# Patient Record
Sex: MALE | Race: WHITE
[De-identification: names, ages, dates, MRNs, and addresses within clinical notes are randomized per-mention and may not be internally consistent; named-entity substitution may affect disease eponyms.]

---

## 2021-05-27 NOTE — XMS
PreManage Notification: ELDA VARGAS MRN:[id number]
 
Security Information
 
Security Events
No recent Security Events currently on file
 
 
 
CRITERIA MET
------------
- [facility]
- [facility] - 2 Visits in 30 Days
 
 
CARE PROVIDERS
There are no care providers on record at this time.
 
Yan has no Care Guidelines for this patient.
 
JAKE VISIT COUNT (12 MO.)
-------------------------------------------------------------------------------------
[facility]
-------------------------------------------------------------------------------------
TOTAL 2
-------------------------------------------------------------------------------------
NOTE: Visits indicate total known visits.
 
ED/C VISIT TRACKING (12 MO.)
-------------------------------------------------------------------------------------
[facility]2021 [facility]
 
[facility]
 
COMPLAINT:
- L KNEE WOUND CARE
-------------------------------------------------------------------------------------
05/25/2021 15:03
[facility] [place]
 
TYPE: Emergency
 
COMPLAINT:
- L KNEE PAIN
-------------------------------------------------------------------------------------
 
 
INPATIENT VISIT TRACKING (12 MO.)
No inpatient visits to display in this time frame
 
[email or web address]

## 2021-07-17 NOTE — XMS
PreManage Notification: ELDA VARGAS MRN:[id number]
 
Security Information
 
Security Events
No recent Security Events currently on file
 
 
 
CRITERIA MET
------------
- [facility] - 2 [facility]
 
 
CARE PROVIDERS
-------------------------------------------------------------------------------------
RENETTA CARR      [facility]     06/02/2021-Current
 
PHONE: [phone]
-------------------------------------------------------------------------------------
 
Yan has no Care Guidelines for this patient.
 
JAKE VISIT COUNT (12 MO.)
-------------------------------------------------------------------------------------
4 [facility]
-------------------------------------------------------------------------------------
TOTAL 4
-------------------------------------------------------------------------------------
NOTE: Visits indicate total known visits.
 
ED/UCC VISIT TRACKING (12 MO.)
-------------------------------------------------------------------------------------
07/17/2021 14:07
[facility] [place]
 
TYPE: Emergency
 
COMPLAINT:
- BEE STING
-------------------------------------------------------------------------------------
06/28/2021 06:38
[facility] [place]
 
TYPE: Emergency
 
COMPLAINT:
- TONGUE SWELLING
 
DIAGNOSES:
- Essential (primary) hypertension
- Angioneurotic edema, initial encounter
- Allergy status to sulfonamides
- Gastro-esophageal reflux disease without esophagitis
- Other long term (current) drug therapy
-------------------------------------------------------------------------------------
05/27/2021 07:01
[facility] [place]
 
 
TYPE: Emergency
 
COMPLAINT:
- L KNEE INFECTION
 
DIAGNOSES:
- Long term (current) use of opiate analgesic
- Personal history of nicotine dependence
- Essential (primary) hypertension
- Allergy status to sulfonamides
- Gastro-esophageal reflux disease without esophagitis
- Other long term (current) drug therapy
- Cellulitis of left lower limb
-------------------------------------------------------------------------------------
05/25/2021 15:03
[facility] [place]
 
TYPE: Emergency
 
COMPLAINT:
- L KNEE PAIN
 
DIAGNOSES:
- Allergy status to sulfonamides
- Gastro-esophageal reflux disease without esophagitis
- Prepatellar bursitis, left knee
- Essential (primary) hypertension
- Long term (current) use of opiate analgesic
- Other long term (current) drug therapy
-------------------------------------------------------------------------------------
 
 
INPATIENT VISIT TRACKING (12 MO.)
No inpatient visits to display in this time frame
 
[email or web address]

## 2023-03-20 ENCOUNTER — HOSPITAL ENCOUNTER (OUTPATIENT)
Dept: HOSPITAL 46 - DS | Age: 53
Discharge: HOME | End: 2023-03-20
Attending: SPECIALIST
Payer: COMMERCIAL

## 2023-03-20 VITALS — HEIGHT: 69 IN | WEIGHT: 244.49 LBS | BODY MASS INDEX: 36.21 KG/M2

## 2023-03-20 DIAGNOSIS — Z88.8: ICD-10-CM

## 2023-03-20 DIAGNOSIS — Z79.899: ICD-10-CM

## 2023-03-20 DIAGNOSIS — G89.18: ICD-10-CM

## 2023-03-20 DIAGNOSIS — M17.11: Primary | ICD-10-CM

## 2023-03-20 DIAGNOSIS — Z88.2: ICD-10-CM

## 2023-03-20 PROCEDURE — 0SRC0JA REPLACEMENT OF RIGHT KNEE JOINT WITH SYNTHETIC SUBSTITUTE, UNCEMENTED, OPEN APPROACH: ICD-10-PCS | Performed by: SPECIALIST

## 2023-03-20 PROCEDURE — C1713 ANCHOR/SCREW BN/BN,TIS/BN: HCPCS

## 2023-03-20 PROCEDURE — C1776 JOINT DEVICE (IMPLANTABLE): HCPCS

## 2023-03-20 NOTE — NUR
1447-PATIENT RATES PAIN 5/10. STATES HE CAN NOT FEEL THE COLD FROM THE ICE
PACK. PAIN MEDICATION GIVEN PER EMAR.
 
1449-PATIENT LAYING IN BED. RESP EVEN AND UNLABORED. RATES PAIN 5/10. DENIES
NAUSEA. READJUSTED CRYO CUFF AND THIS SEEMED TO HELP WITH PAIN. DRESSING IS
CLEAN, DRY, AND INTACT. ON-Q PUMP SET AT 4. WIFE IN ROOM. CALL LIGHT WITHIN
ROOM.
 
1455-PT IN PATIENTS ROOM.

## 2023-03-20 NOTE — NUR
1237-PATIENT STATES PAIN IS A 5/10. STATES THE PAIN IS IN THE BACK OF THE
KNEE. DESCRIBES IT AS THROBBING PAIN. MOVED ICE PACK TO BACK OF KNEE AND PAIN
MEDICATION GIVEN PER EMAR.

## 2023-03-20 NOTE — NUR
1550-PROVIDED DISCHARGE INSTRUCTIONS TO PATIENT AND HIS WIFE. ALL QUESTIONS
ANSWERD. RATES PAIN 2/10. PATIENT AMBULATES WITH WALKER TO WHEELCHAIR GAIT
STEADY AND TOLERATED. RIDE PROVIEDED TO FRONT OF HOSPITAL WHERE WIFE WAS
WAITING WITH THE CAR.

## 2023-03-20 NOTE — NUR
1525-PATIENT BACK TO ROOM FROM PT.
 
1533-PATIENT SITTING AT THE END OF BED. VSS. PATIENT IS READY TO GO HOME.
DRESSING IS CLEAN, DRY, AND INTACT. RATES PAIN A 2/10.

## 2023-03-20 NOTE — NUR
1125-PATIENT BACK TO ROOM FROM PACU ON RA. RECEIVED REPORT FROM DANNIELLE TERAN.
PATIENT IS AWAKE TAKING SIPS OF WATER. RESP EVEN AND UNLABORED. DENIES PAIN
AND NAUSEA. DRESSING IS CLEAN, DRY, AND INTACT. ON-Q PUMP SET AT 4. PROVIDED
PATIENT WITH CRACKERS. FAMILY IN ROOM. CALL LIGHT WITHIN REACH. LIGHTS TURNED
DOWN.

## 2023-03-20 NOTE — NUR
1240-PATINET LAYING IN BED AWAKE. RESP EVEN AND UNLABORED. RATES PAIN 5/10.
DENIES NAUSEA. DRESSING IS CLEAN, DRY, AND INTACT. ON-Q PUMP SET AT 4. CRYO
CUFF IN PLACE AND RUNNING. PATIENT STATES HAVING THE ICE PACK ON THE BACK SIDE
OF THE KNEE IS HELPFUL. LUNCH ORDERD. FAMILY IN ROOM CALL LIGHT WITHIN REACH.

## 2023-03-20 NOTE — NUR
PT ALERT, ORIENTED AND SUPPORTED BY HIS WIFE NAUN AND MOTHER LUDWIN. PT SAID
HE IS TIRED OF CHRONIC PAIN, LOOKING FORWARD TO RELIEF. FAMILY WILL REMAIN FOR
DC, GAVE ENCOURAGEMENT. PT REQUESTED PRAYER, WILL FOLLOW

## 2023-03-20 NOTE — NUR
1351-PATIENT LAYING IN BED WATCHING TV. RESP RATE EVEN AND UNLABORED. RATES
PAIN 3/10. DENIES NAUSEA. DRESSING IS CLEAN, DRY, AND INTACT. ON-Q PUMP SET AT
4. CRYO CUFF PLACED ON BACK SIDE OF KNEE AND PATIENT STATES THIS CONTINUES TO
HELP WITH PAIN CONTROL. CALL LIGHT WITHIN REACH.

## 2023-03-20 NOTE — NUR
03/20/23 1102 Antonina Thomson
1058 PATIENT ARRIVES TO PACU RESTING WITH EYES CLOSED. FOLLOWS
COMMANDS TO COUGH AND DEEP BREATHE. RESP EVEN AND UNLABORED, MASK AT
10 LITERS, NASAL TRUMPET IN PLACE.

## 2023-03-20 NOTE — OR
[facility]
                                    [address], [place]  [address]
_________________________________________________________________________________________
                                                                 Draft    
 
 
DATE OF OPERATION:
03/20/2023
 
SURGEON:
Anselmo Carbajal MD
 
PREOPERATIVE DIAGNOSIS:
Severe DJD, right knee.
 
POSTOPERATIVE DIAGNOSIS:
Severe DJD, right knee.
 
PROCEDURE PERFORMED:
Right total knee arthroplasty with Jacky.
 
ASSISTANT:
Courtney Downs PA-C.  Courtney was present and critical for all portions of the
procedure 
 
ANESTHESIA:
Spinal.
 
BLOOD LOSS:
175 mL.
 
TOURNIQUET TIME:
Zero.
 
IMPLANTS:
Sherrell Triathlon size 4 femur, 5 tibia, 10 mm insert and 35 mm patella.
 
BRIEF HISTORY:
Elda is a 52-year-old gentleman with significant bone-on-bone arthritis in his right
knee.  Risks and benefits of operative treatment were discussed with him after failure
of nonoperative treatment.  Risks, benefits, and alternatives were discussed and he
elected to proceed. 
 
DESCRIPTION OF PROCEDURE:
Once consent was obtained, he was taken to the operating room. After adequate
anesthesia, he was placed on the operating table.  A hip bump was placed and the leg was
prepped and draped in a standard sterile fashion.  The knee was approached through a
standard anterior midline incision.  A midvastus arthrotomy was performed.  The MCL was
 
                                                                                    
_________________________________________________________________________________________
PATIENT NAME:     ELDA VARGAS                        
MEDICAL RECORD #: [id number]            OPERATIVE REPORT              
          ACCT #: [id number]  
DATE OF BIRTH:   [birth year]            REPORT #: [id number]      
PHYSICIAN:        ANSELMO CARBAJAL MD              
PCP:              LAUREN DOBBS MD                 
REPORT IS CONFIDENTIAL AND NOT TO BE RELEASED WITHOUT AUTHORIZATION
 
 
                                  [facility]
                                    [address], [place]  [address]
_________________________________________________________________________________________
                                                                 Draft    
 
 
elevated with a sleeve around the posteromedial corner.  The anterior horns of the
menisci and the ACL were transected.  The infrapatellar fat pad was excised.  The
computer array was then placed in the medial femoral condyle and the proximal tibia.
The leg was then registered with computer followed by the fine anatomic points of the
knee.  Varus valgus testing was undertaken and the external rotation of the femur was
adjusted slightly.  The robot was then brought in and the four straight cuts and two
angled cuts were made with care taken to protect the patellar tendon.  The MCL was also
protected with a retractor.  The bone was then removed.  Posterior osteophytes were
removed off the femur and no posterior release was performed.  The trials were then
positioned with a 10 mm polyethylene.  He had good range of motion with excellent
stability from 0 to about 135 degrees of flexion.  The patella tracked well.  The
patella was then cut sized and drilled for a 35 mm patella.  The distal femoral drill
holes were finished and the keel punch and drill holes were made on the proximal tibia.
The prosthesis was selected and opened and the knee was irrigated in the posterior
aspect.  The tibia was then impacted in position until it was well-seated.  The
polyethylene was snapped into place.  The femur was then impacted.  The knee was
extended and nicely loaded.  The patella was clamped until it was well-seated.  The
patellar tracking was again checked and found to be good.  The knee was then irrigated
with Surgiphor followed by normal saline.  The periarticular soft tissues were injected
with 100 mL of ropivacaine and Toradol mixture.  The On-Q pain pump was percutaneously
placed into the adductor canal from the suprapatellar pouch.  The arthrotomy was then
closed with #2 FiberWire followed by #2 Stratafix.  The subcutaneous tissue was closed
with 0 Stratafix, and skin with 3-0 Stratafix and LiquiBand.  Wounds dressed with
Aquacel dressing, ABD, and Ace wrap.  He tolerated the procedure well.  All sponge,
needle, and instrument counts were correct. 
 
 
 
            ________________________________________
            Anselmo Carbajal MD 
 
 
BA/MODL
Job #:  [id number]
DD:  03/20/2023 10:49:32
DT:  03/20/2023 13:17:56
 
 
Copies:                                
~
 
 
 
                                                                                    
_________________________________________________________________________________________
PATIENT NAME:     ELDA VARGAS                        
MEDICAL RECORD #: [id number]            OPERATIVE REPORT              
          ACCT #: [id number]  
DATE OF BIRTH:   [birth year]            REPORT #: [id number]      
PHYSICIAN:        ANSELMO CARBAJAL MD              
PCP:              LAUREN DOBBS MD                 
REPORT IS CONFIDENTIAL AND NOT TO BE RELEASED WITHOUT AUTHORIZATION
Verbalized Understanding